# Patient Record
Sex: MALE | Race: WHITE | Employment: STUDENT | URBAN - METROPOLITAN AREA
[De-identification: names, ages, dates, MRNs, and addresses within clinical notes are randomized per-mention and may not be internally consistent; named-entity substitution may affect disease eponyms.]

---
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
## 2024-08-02 ENCOUNTER — OFFICE VISIT (OUTPATIENT)
Dept: FAMILY MEDICINE CLINIC | Facility: CLINIC | Age: 19
End: 2024-08-02
Payer: COMMERCIAL

## 2024-08-02 VITALS
OXYGEN SATURATION: 97 % | TEMPERATURE: 98 F | RESPIRATION RATE: 16 BRPM | WEIGHT: 190 LBS | BODY MASS INDEX: 24.13 KG/M2 | HEIGHT: 74.5 IN | HEART RATE: 67 BPM | SYSTOLIC BLOOD PRESSURE: 114 MMHG | DIASTOLIC BLOOD PRESSURE: 66 MMHG

## 2024-08-02 DIAGNOSIS — R25.1 TREMOR OF BOTH HANDS: ICD-10-CM

## 2024-08-02 DIAGNOSIS — Z02.5 SPORTS PHYSICAL: Primary | ICD-10-CM

## 2024-08-02 PROCEDURE — 99395 PREV VISIT EST AGE 18-39: CPT | Performed by: FAMILY MEDICINE

## 2024-08-02 NOTE — PROGRESS NOTES
Here for sports physical plays volleyball and others like soccer  softball beach volleyball and others ///says he is very active /does sometimes running without issues   General;  Doing good in school   Pt denies headache or dizziness no visual issues  No cp or sob no pina no palpitations  No abdomen  pain no nausea or vomiting  No leg pain no abdominal distention no numbness or any tingling or any focal weakness  Eating ok  No alt in bowel movement  No blood is stools or urine  No urinary issues  No leg pain or swelling  No abdomen distension  No fever chills  No cough congestion  No depression no suicidal ideation or  homicidal ideation no hallucinations   Denies any smoking  denies vaping or drugs ,sometimes occasional alcohol   No testicular pain or masses   Self testicular exams dw   Denies any jt pain   No back pain   Denies anxiety denies depression when in [employer] in college sometimes misses  parents ?of a day or so in a yr  ,is  in mechanical engineering ,no hopelessness no si or hi whatsoever ,says doing good in school   No issues with anyone per him   Denies dropping things from hand   No issues with gait         He states minimal hand tremers when he extend his hand no other neuological issues says it never bothered him in anyways   No headache  or dizziness no numbness or tingling or focal weaknesss   Says its like that all his life says his brother also has it who is 25 yrs old      No brain tumors in family             HISTORY:  No chief complaint on file.      The following portions of the patient's history were reviewed and updated as appropriate:  Past Medical History:    Jumper's knee of left side     Patient Active Problem List   Diagnosis    Chronic foot pain, right     History reviewed. No pertinent surgical history.  Family History   Problem Relation Age of Onset    Colon Cancer Paternal Aunt     Cancer Paternal Great-Grandmother      Social History     Socioeconomic History    Marital  status: Single   Tobacco Use    Smoking status: Never    Smokeless tobacco: Never   Substance and Sexual Activity    Alcohol use: Never    Drug use: Never       No current outpatient medications on file.       No Known Allergies      Review of Systems  Const: Denies fever chills  Eyes: Denies drainage no pain with movement of eye  ENT: denies sore throat,no ear pain ,no sinus drainage  CV: Denies chest pain or palpitations  Pulm: Denies shortness of breath  GI: Denies abdominal pain, nausea, vomiting or diarrhea  : Denies dysuria  Musculoskeletal: Denies neck or back pain  Skin: Denies rashes  Neuro: mild hand tremers when he extends them  chronic in nature as long as he can remember says his bother  also has it Denies focal weakness or numbness,no headache or dizzienss   Psych denies depression no si or hi   No hallucinations       Vitals:    08/02/24 0859   BP: 114/66   Pulse: 67   Resp: 16   Temp: 98 °F (36.7 °C)   SpO2: 97%   Weight: 190 lb (86.2 kg)   Height: 6' 2.5\" (1.892 m)        Physical Exam  General Appearance:  Alert, oriented, in no acute distress  Eyes no discharge ent airway patent     Neck ;soft supple,no obvious swelling  CNS: no acute focal neurological deficits hand grio good bl ms strength 5/5 all ext   Bare minimal tremers when he stretches his hands forward   Gait ok   Getting up and down the table without issues walking in hallway without issues  Writing /filling for without issues   Perlla eomi   Chest Wall:  no chest wall tenderness.  Lungs:  Normal expansion.  Clear to auscultation.   Heart:  Heart sounds are normal.    Abdomen:  Soft, non-tender, normal bowel sounds;  Psych mood and affect appropriate  skin ;no obvious skin lesion in exposed area  Lower Extremities: No gross edema,  Testicular exam deferred at pt request he denies any mass or pain or tenderness he will do self exam s        Assessment/Plan:    Diagnoses and all orders for this visit:    Sports physical  Can participate  in sports   Tremor of both hands  -     NEURO - INTERNAL  -     CBC W Differential W Platelet [E]; Future  -     Comp Metabolic Panel (14) [E]; Future  -     TSH [E]; Future  -     Hemoglobin A1C [E]; Future  Pt For now he isnt interested in any imaging  or any meds  Symptoms very mild doesn't bother him in any way per him   He agrees to get blood work and see neuro   Drink more fluids  No alcohol whatsoever   See neuro get there opinion  he will he says   Imp of following with neuro dw         Patient Active Problem List   Diagnosis    Chronic foot pain, right       Patient's Body mass index is 24.07 kg/m².    .    Return in about 3 months (around 11/2/2024). Imp pf fu dw     No current [facility]-ordered outpatient medications on file.    Obi Hollins MD  8/2/2024

## 2025-08-18 ENCOUNTER — PATIENT MESSAGE (OUTPATIENT)
Dept: FAMILY MEDICINE CLINIC | Facility: CLINIC | Age: 20
End: 2025-08-18

## 2025-08-19 ENCOUNTER — LAB ENCOUNTER (OUTPATIENT)
Dept: LAB | Age: 20
End: 2025-08-19
Attending: FAMILY MEDICINE

## 2025-08-19 DIAGNOSIS — E55.9 VITAMIN D DEFICIENCY: ICD-10-CM

## 2025-08-19 DIAGNOSIS — Z02.5 SPORTS PHYSICAL: ICD-10-CM

## 2025-08-19 LAB
ALBUMIN SERPL-MCNC: 4.9 G/DL (ref 3.2–4.8)
ALBUMIN/GLOB SERPL: 2.1 (ref 1–2)
ALP LIVER SERPL-CCNC: 89 U/L (ref 45–117)
ALT SERPL-CCNC: 23 U/L (ref 10–49)
ANION GAP SERPL CALC-SCNC: 10 MMOL/L (ref 0–18)
AST SERPL-CCNC: 26 U/L (ref ?–34)
BASOPHILS # BLD AUTO: 0.02 X10(3) UL (ref 0–0.2)
BASOPHILS NFR BLD AUTO: 0.4 %
BILIRUB SERPL-MCNC: 0.8 MG/DL (ref 0.3–1.2)
BUN BLD-MCNC: 10 MG/DL (ref 9–23)
CALCIUM BLD-MCNC: 10.2 MG/DL (ref 8.7–10.6)
CHLORIDE SERPL-SCNC: 105 MMOL/L (ref 98–112)
CHOLEST SERPL-MCNC: 183 MG/DL (ref ?–200)
CO2 SERPL-SCNC: 28 MMOL/L (ref 21–32)
CREAT BLD-MCNC: 1.35 MG/DL (ref 0.7–1.3)
EGFRCR SERPLBLD CKD-EPI 2021: 77 ML/MIN/1.73M2 (ref 60–?)
EOSINOPHIL # BLD AUTO: 0.04 X10(3) UL (ref 0–0.7)
EOSINOPHIL NFR BLD AUTO: 0.7 %
ERYTHROCYTE [DISTWIDTH] IN BLOOD BY AUTOMATED COUNT: 12.6 %
FASTING PATIENT LIPID ANSWER: YES
FASTING STATUS PATIENT QL REPORTED: YES
GLOBULIN PLAS-MCNC: 2.3 G/DL (ref 2–3.5)
GLUCOSE BLD-MCNC: 91 MG/DL (ref 70–99)
HCT VFR BLD AUTO: 46.1 % (ref 39–53)
HDLC SERPL-MCNC: 67 MG/DL (ref 40–59)
HGB BLD-MCNC: 15.7 G/DL (ref 13–17.5)
IMM GRANULOCYTES # BLD AUTO: 0.01 X10(3) UL (ref 0–1)
IMM GRANULOCYTES NFR BLD: 0.2 %
LDLC SERPL CALC-MCNC: 103 MG/DL (ref ?–100)
LYMPHOCYTES # BLD AUTO: 1.51 X10(3) UL (ref 1–4)
LYMPHOCYTES NFR BLD AUTO: 27.1 %
MCH RBC QN AUTO: 31.2 PG (ref 26–34)
MCHC RBC AUTO-ENTMCNC: 34.1 G/DL (ref 31–37)
MCV RBC AUTO: 91.7 FL (ref 80–100)
MONOCYTES # BLD AUTO: 0.52 X10(3) UL (ref 0.1–1)
MONOCYTES NFR BLD AUTO: 9.3 %
NEUTROPHILS # BLD AUTO: 3.48 X10 (3) UL (ref 1.5–7.7)
NEUTROPHILS # BLD AUTO: 3.48 X10(3) UL (ref 1.5–7.7)
NEUTROPHILS NFR BLD AUTO: 62.3 %
NONHDLC SERPL-MCNC: 116 MG/DL (ref ?–130)
OSMOLALITY SERPL CALC.SUM OF ELEC: 295 MOSM/KG (ref 275–295)
PLATELET # BLD AUTO: 242 10(3)UL (ref 150–450)
POTASSIUM SERPL-SCNC: 4.4 MMOL/L (ref 3.5–5.1)
PROT SERPL-MCNC: 7.2 G/DL (ref 5.7–8.2)
RBC # BLD AUTO: 5.03 X10(6)UL (ref 4.3–5.7)
SODIUM SERPL-SCNC: 143 MMOL/L (ref 136–145)
TRIGL SERPL-MCNC: 68 MG/DL (ref 30–149)
TSI SER-ACNC: 3.4 UIU/ML (ref 0.55–4.78)
VIT D+METAB SERPL-MCNC: 32.8 NG/ML (ref 30–100)
VLDLC SERPL CALC-MCNC: 11 MG/DL (ref 0–30)
WBC # BLD AUTO: 5.6 X10(3) UL (ref 4–11)

## 2025-08-19 PROCEDURE — 80053 COMPREHEN METABOLIC PANEL: CPT

## 2025-08-19 PROCEDURE — 84443 ASSAY THYROID STIM HORMONE: CPT

## 2025-08-19 PROCEDURE — 85025 COMPLETE CBC W/AUTO DIFF WBC: CPT

## 2025-08-19 PROCEDURE — 82306 VITAMIN D 25 HYDROXY: CPT

## 2025-08-19 PROCEDURE — 80061 LIPID PANEL: CPT

## 2025-08-19 PROCEDURE — 36415 COLL VENOUS BLD VENIPUNCTURE: CPT

## (undated) NOTE — LETTER
Name:  Clementina Renee Year:  11th Grade Class: Student ID No.:   Address:  [address] [facility] [place] [id number] Phone:  [phone] (home)  :  14year old   Name Relationship [address] Work Phone Home Phone Mobile Phone      HISTORY FORM 12. Has anyone in your family had unexplained fainting, seizures, or near drowning? BONE AND JOINT QUESTIONS Yes No   17. Have you ever had an injury to a bone, muscle, ligament, or tendon that caused you to miss a practice or a game?      18. Have you /fall?     36. Have you ever become ill while exercising in the heat?     41. Do you get frequent muscle cramps when exercising? 42. Do you or someone in your family have sickle cell trait or disease? 43.  Have you ever had any problems with your ey Lungs Yes    Abdomen Yes    Genitourinary (males only)* Yes    Skin:  HSV, lesions suggestive of MRSA, tinea corporis Yes    Neurologic* Yes    MUSCULOSKELETAL     Neck Yes    Back Yes    Shoulder/arm Yes    Elbow/forearm Yes    Wrist/hand/fingers Yes    [employer] state series events or during the school day, and I/our student do/does hereby agree to submit to such testing and analysis by a certified laboratory.  We further understand and agree that the results of the performance-enhancing substance testing may be pr

## (undated) NOTE — LETTER
[facility] [employer] of Child Health Examination       Student's Name  Lenox Siemens Birth Date Signature                                                                                                                                   Title                           Date     Signature Grade Level/ID#  {DMG_GRADE:1366}   HEALTH HISTORY          TO BE COMPLETED AND SIGNED BY PARENT/GUARDIAN AND VERIFIED BY HEALTH CARE PROVIDER    ALLERGIES  (Food, drug, insect, other)  Patient has no known allergies.  MEDICATION  (List all prescribed or t PHYSICAL EXAMINATION REQUIREMENTS    Entire section below to be completed by MD/DO/APN/PA       PHYSICAL EXAMINATION REQUIREMENTS (head circumference if <33 years old): There were no vitals taken for this visit.     DIABETES SCREENING  BMI>85% age/sex  { Ears {YES:829::\"Yes\"}                      Screen result: Gastrointestinal {YES:829::\"Yes\"}    Eyes {YES:829::\"Yes\"}     Screen result:   Genito-Urinary {YES:829::\"Yes\"}  LMP   Nose {YES:829::\"Yes\"}  Neurological {YES:829::\"Yes\"}    Throat {YES Print Name  Roque Pop MD                                                 Signature                                                                                Date  2/10/2021   Address/Phone  [address]  [address]

## (undated) NOTE — LETTER
Name:  Keith Jimenez Year:  12th Grade Class: Student ID No.:   Address:  [place] [address] Phone:  [phone] (home)  :  15year old   Name Relationship [address] Work Phone Home Phone Mobile Phone   1.  Anita Kc Has anyone in your family had unexplained fainting, seizures, or near drowning? BONE AND JOINT QUESTIONS Yes No   17. Have you ever had an injury to a bone, muscle, ligament, or tendon that caused you to miss a practice or a game?      18. Have you ever 40. Have you ever become ill while exercising in the heat?     41. Do you get frequent muscle cramps when exercising? 42. Do you or someone in your family have sickle cell trait or disease? 43.  Have you ever had any problems with your eyes or v (PMI) Yes    Pulses Yes    Lungs Yes    Abdomen Yes    Genitourinary (males only)* Yes    Skin:  HSV, lesions suggestive of MRSA, tinea corporis Yes    Neurologic* Yes    MUSCULOSKELETAL     Neck Yes    Back Yes    Shoulder/arm Yes    Elbow/forearm Yes body either during [employer] state series events or during the school day, and I/our student do/does hereby agree to submit to such testing and analysis by a certified laboratory.  We further understand and agree that the results of the performance-enhancing sub